# Patient Record
Sex: MALE | Race: ASIAN | NOT HISPANIC OR LATINO | URBAN - METROPOLITAN AREA
[De-identification: names, ages, dates, MRNs, and addresses within clinical notes are randomized per-mention and may not be internally consistent; named-entity substitution may affect disease eponyms.]

---

## 2020-09-13 ENCOUNTER — EMERGENCY (EMERGENCY)
Facility: HOSPITAL | Age: 40
LOS: 1 days | Discharge: HOME | End: 2020-09-13
Attending: EMERGENCY MEDICINE | Admitting: EMERGENCY MEDICINE
Payer: MEDICAID

## 2020-09-13 VITALS
OXYGEN SATURATION: 99 % | RESPIRATION RATE: 16 BRPM | DIASTOLIC BLOOD PRESSURE: 97 MMHG | HEIGHT: 63 IN | TEMPERATURE: 98 F | HEART RATE: 88 BPM | SYSTOLIC BLOOD PRESSURE: 145 MMHG

## 2020-09-13 PROCEDURE — 99283 EMERGENCY DEPT VISIT LOW MDM: CPT

## 2020-09-13 RX ORDER — OXYCODONE AND ACETAMINOPHEN 5; 325 MG/1; MG/1
1 TABLET ORAL ONCE
Refills: 0 | Status: DISCONTINUED | OUTPATIENT
Start: 2020-09-13 | End: 2020-09-13

## 2020-09-13 RX ORDER — OXYCODONE HYDROCHLORIDE 5 MG/1
1 TABLET ORAL
Qty: 7 | Refills: 0
Start: 2020-09-13 | End: 2020-09-19

## 2020-09-13 RX ORDER — DIAZEPAM 5 MG
1 TABLET ORAL
Qty: 12 | Refills: 0
Start: 2020-09-13 | End: 2020-09-16

## 2020-09-13 RX ORDER — DIAZEPAM 5 MG
5 TABLET ORAL ONCE
Refills: 0 | Status: DISCONTINUED | OUTPATIENT
Start: 2020-09-13 | End: 2020-09-13

## 2020-09-13 RX ORDER — LIDOCAINE 4 G/100G
1 CREAM TOPICAL ONCE
Refills: 0 | Status: COMPLETED | OUTPATIENT
Start: 2020-09-13 | End: 2020-09-13

## 2020-09-13 RX ADMIN — LIDOCAINE 1 PATCH: 4 CREAM TOPICAL at 22:50

## 2020-09-13 RX ADMIN — Medication 5 MILLIGRAM(S): at 22:50

## 2020-09-13 RX ADMIN — OXYCODONE AND ACETAMINOPHEN 1 TABLET(S): 5; 325 TABLET ORAL at 22:51

## 2020-09-13 NOTE — ED PROVIDER NOTE - OBJECTIVE STATEMENT
39 y/o male no PMH presents to ER c/o back pain x 3 days. Pt. staets 3 days ago developed lower back which has gotten progressivley worse - describes 10/10 lower back pain which is worse with movement - patient went to [place] ER today - was given naproxen and robaxin with minimal relief. States he was dc with no plan and doesn't know what to do next. Denies incontinence numbness tingling weakness fever chills.

## 2020-09-13 NOTE — ED ADULT NURSE NOTE - CHIEF COMPLAINT QUOTE
pt c/o lower back pain since Friday, seen at [facility] today prescribed Robaxin and Naproxen, states took 1 dose at 6 pm, "but its not helping". denies any trauma or injury to lower back, denies urinary symptoms.

## 2020-09-13 NOTE — ED PROVIDER NOTE - ATTENDING CONTRIBUTION TO CARE
41 yo ow healthy male with 3 days of low back pain.  Progressively getting worse.  Movement exacerbates the pain.  Does not radiate.  Went to OSH where was given NSAIDs and robaxin which provided some relief.  Presenting today for second opinion and more care.  NO bowel or bladder incontinence no weakness and no fevers    Gen:  Well appearing in NAD  Head:  NC/AT  Resp: No distress   Ext: no deformities  Skin: warm and dry as visualized     41 yo with low back pain.  Seems MS in nature.  No red flags for cord compression.  Will treat symptomatically and provide spine follow up

## 2020-09-13 NOTE — ED ADULT TRIAGE NOTE - CHIEF COMPLAINT QUOTE
pt c/o lower back pain since Friday, seen at [facility] today prescribed Robaxin and Naproxen, states took 1 dose at 6 pm, "but its not helping". denies any trauma or injury to lower back, denies urinary symptoms.

## 2020-09-13 NOTE — ED ADULT NURSE NOTE - OBJECTIVE STATEMENT
Pt. presents to intake room 10, A&OX4, ambulatory. Pt. c/o lower back pain for the past 5 days. Pt. states he was seen at [facility] and discharged but since then his back pain has only gotten worse. Respirations even and unlabored. pt. denies any chest pain, SOB, HA, fevers, or chills. Meds given per MD order. Comfort measures in place. Awaiting further orders. Will continue to monitor.

## 2020-09-13 NOTE — ED PROVIDER NOTE - CLINICAL SUMMARY MEDICAL DECISION MAKING FREE TEXT BOX
41 y/o male c/o lower back pain x 3 days  -possible muscle strain vs radiculopathy  -pain contro - nsaids/valium/lidoderm  -outpt ortho/spine follow up

## 2020-09-13 NOTE — ED PROVIDER NOTE - PATIENT PORTAL LINK FT
You can access the FollowMyHealth Patient Portal offered by [facility] by registering at the following website: [email or web address]. By joining [employer]’s FollowMyHealth portal, you will also be able to view your health information using other applications (apps) compatible with our system.

## 2020-09-13 NOTE — ED PROVIDER NOTE - MUSCULOSKELETAL MINIMAL EXAM
back: no midline tenderness, no swelling or obvious deformity. full rom with mild pain. bl lower ext ms 5/5 - able to straigth leg raise without difficulty. able to ambulate with pain. sensations intact.

## 2020-09-13 NOTE — ED PROVIDER NOTE - NSFOLLOWUPINSTRUCTIONS_ED_ALL_ED_FT
REST, NO STRENUOUS ACTIVITY  CONTINUE TAKING ANY CURRENT MEDICATIONS  TAKE ANY NEW MEDICATIONS AS DIRECTED  **FOLLOW UP WITH ORTHOPEDICS/SPINE AS DIRECTED**  REFERRAL SHEET GIVEN  RETURN TO ER FOR WORSENING SYMPTOMS

## 2020-09-16 ENCOUNTER — APPOINTMENT (OUTPATIENT)
Dept: ORTHOPEDIC SURGERY | Facility: CLINIC | Age: 40
End: 2020-09-16
Payer: COMMERCIAL

## 2020-09-16 VITALS
WEIGHT: 160 LBS | HEIGHT: 63 IN | SYSTOLIC BLOOD PRESSURE: 153 MMHG | HEART RATE: 79 BPM | BODY MASS INDEX: 28.35 KG/M2 | DIASTOLIC BLOOD PRESSURE: 85 MMHG

## 2020-09-16 VITALS — TEMPERATURE: 98.1 F

## 2020-09-16 DIAGNOSIS — M47.817 SPONDYLOSIS W/OUT MYELOPATHY OR RADICULOPATHY, LUMBOSACRAL REGION: ICD-10-CM

## 2020-09-16 DIAGNOSIS — S39.012A STRAIN OF MUSCLE, FASCIA AND TENDON OF LOWER BACK, INITIAL ENCOUNTER: ICD-10-CM

## 2020-09-16 DIAGNOSIS — M54.5 LOW BACK PAIN: ICD-10-CM

## 2020-09-16 PROBLEM — Z00.00 ENCOUNTER FOR PREVENTIVE HEALTH EXAMINATION: Status: ACTIVE | Noted: 2020-09-16

## 2020-09-16 PROCEDURE — 99203 OFFICE O/P NEW LOW 30 MIN: CPT

## 2020-09-16 PROCEDURE — 72100 X-RAY EXAM L-S SPINE 2/3 VWS: CPT

## 2020-09-16 RX ORDER — MELOXICAM 15 MG/1
15 TABLET ORAL
Qty: 30 | Refills: 0 | Status: ACTIVE | COMMUNITY
Start: 2020-09-16 | End: 1900-01-01

## 2020-09-16 RX ORDER — CYCLOBENZAPRINE HYDROCHLORIDE 10 MG/1
10 TABLET, FILM COATED ORAL 3 TIMES DAILY
Qty: 60 | Refills: 0 | Status: ACTIVE | COMMUNITY
Start: 2020-09-16 | End: 1900-01-01

## 2020-09-16 NOTE — PHYSICAL EXAM
[Antalgic] : not antalgic [de-identified] : Examination of the lumbar spine reveals no midline tenderness palpation, step-offs, or skin lesions. Decreased range of motion with respect to flexion, extension, lateral bending, and rotation. No tenderness to palpation of the sciatic notch. No tenderness palpation of the bilateral greater trochanters. No pain with passive internal/external rotation of the hips. No instability of bilateral lower extremities.  Negative SONIA. Negative straight leg raise bilaterally. No bowstring. Negative femoral stretch. 5 out of 5 iliopsoas, hip abductors, hips adductors, quadriceps, hamstrings, gastrocsoleus, tibialis anterior, extensor hallucis longus, peroneals. Grossly intact sensation to light touch bilateral lower extremities. 1+ patellar and Achilles reflexes. Downgoing Babinski. No clonus. Intact proprioception. Palpable pulses. No skin lesion and no edema on the right and left lower extremities. [de-identified] : AP lateral lumbar x-rays reveals mild spondylosis without instability or aggressive lesions

## 2020-09-16 NOTE — DISCUSSION/SUMMARY
[de-identified] : He will try course of physical therapy as well as meloxicam and Flexeril.  MRI if not improved.

## 2020-09-16 NOTE — HISTORY OF PRESENT ILLNESS
[de-identified] : Mr. SANTOS MUIR  is a 40 year old male who presents with one week of low back pain without any specific cause or trauma.  His pain was so bad on Sunday that he went to [facility] ER and then to [facility] ER.  He was given Naprosyn which did not help.  Denies any LE radicular symptoms.  Normal bowel and bladder control.   Denies any recent fevers, chills, sweats, weight loss, or infection.\par \par The patients past medical history, past surgical history, medications, allergies, and social history were reviewed by me today with the patient and documented accordingly.  In addition, the patient's family history, which is noncontributory to their visit, was also reviewed.\par

## 2023-09-12 ENCOUNTER — EMERGENCY (EMERGENCY)
Facility: HOSPITAL | Age: 43
LOS: 1 days | Discharge: HOME | End: 2023-09-12
Attending: EMERGENCY MEDICINE | Admitting: EMERGENCY MEDICINE
Payer: COMMERCIAL

## 2023-09-12 VITALS
DIASTOLIC BLOOD PRESSURE: 73 MMHG | SYSTOLIC BLOOD PRESSURE: 118 MMHG | OXYGEN SATURATION: 100 % | TEMPERATURE: 98 F | HEART RATE: 95 BPM | RESPIRATION RATE: 16 BRPM

## 2023-09-12 VITALS
RESPIRATION RATE: 18 BRPM | HEART RATE: 98 BPM | DIASTOLIC BLOOD PRESSURE: 93 MMHG | OXYGEN SATURATION: 99 % | TEMPERATURE: 99 F | SYSTOLIC BLOOD PRESSURE: 133 MMHG

## 2023-09-12 LAB
ALBUMIN SERPL ELPH-MCNC: 4.5 G/DL — SIGNIFICANT CHANGE UP (ref 3.3–5)
ALP SERPL-CCNC: 85 U/L — SIGNIFICANT CHANGE UP (ref 40–120)
ALT FLD-CCNC: 28 U/L — SIGNIFICANT CHANGE UP (ref 4–41)
AMPHET UR-MCNC: NEGATIVE — SIGNIFICANT CHANGE UP
ANION GAP SERPL CALC-SCNC: 12 MMOL/L — SIGNIFICANT CHANGE UP (ref 7–14)
APAP SERPL-MCNC: <10 UG/ML — LOW (ref 15–25)
APPEARANCE UR: CLEAR — SIGNIFICANT CHANGE UP
AST SERPL-CCNC: 20 U/L — SIGNIFICANT CHANGE UP (ref 4–40)
BACTERIA # UR AUTO: NEGATIVE /HPF — SIGNIFICANT CHANGE UP
BARBITURATES UR SCN-MCNC: NEGATIVE — SIGNIFICANT CHANGE UP
BASE EXCESS BLDV CALC-SCNC: 0 MMOL/L — SIGNIFICANT CHANGE UP (ref -2–3)
BASOPHILS # BLD AUTO: 0.01 K/UL — SIGNIFICANT CHANGE UP (ref 0–0.2)
BASOPHILS NFR BLD AUTO: 0.1 % — SIGNIFICANT CHANGE UP (ref 0–2)
BENZODIAZ UR-MCNC: NEGATIVE — SIGNIFICANT CHANGE UP
BILIRUB SERPL-MCNC: 0.2 MG/DL — SIGNIFICANT CHANGE UP (ref 0.2–1.2)
BILIRUB UR-MCNC: NEGATIVE — SIGNIFICANT CHANGE UP
BLOOD GAS VENOUS COMPREHENSIVE RESULT: SIGNIFICANT CHANGE UP
BUN SERPL-MCNC: 12 MG/DL — SIGNIFICANT CHANGE UP (ref 7–23)
CALCIUM SERPL-MCNC: 8.5 MG/DL — SIGNIFICANT CHANGE UP (ref 8.4–10.5)
CAST: 0 /LPF — SIGNIFICANT CHANGE UP (ref 0–4)
CHLORIDE BLDV-SCNC: 102 MMOL/L — SIGNIFICANT CHANGE UP (ref 96–108)
CHLORIDE SERPL-SCNC: 103 MMOL/L — SIGNIFICANT CHANGE UP (ref 98–107)
CO2 BLDV-SCNC: 26.7 MMOL/L — HIGH (ref 22–26)
CO2 SERPL-SCNC: 25 MMOL/L — SIGNIFICANT CHANGE UP (ref 22–31)
COCAINE METAB.OTHER UR-MCNC: NEGATIVE — SIGNIFICANT CHANGE UP
COLOR SPEC: YELLOW — SIGNIFICANT CHANGE UP
CREAT SERPL-MCNC: 0.59 MG/DL — SIGNIFICANT CHANGE UP (ref 0.5–1.3)
CREATININE URINE RESULT, DAU: 48 MG/DL — SIGNIFICANT CHANGE UP
DIFF PNL FLD: NEGATIVE — SIGNIFICANT CHANGE UP
EGFR: 125 ML/MIN/1.73M2 — SIGNIFICANT CHANGE UP
EOSINOPHIL # BLD AUTO: 0.27 K/UL — SIGNIFICANT CHANGE UP (ref 0–0.5)
EOSINOPHIL NFR BLD AUTO: 3.8 % — SIGNIFICANT CHANGE UP (ref 0–6)
ETHANOL SERPL-MCNC: <10 MG/DL — SIGNIFICANT CHANGE UP
GAS PNL BLDV: 138 MMOL/L — SIGNIFICANT CHANGE UP (ref 136–145)
GLUCOSE BLDV-MCNC: 92 MG/DL — SIGNIFICANT CHANGE UP (ref 70–99)
GLUCOSE SERPL-MCNC: 99 MG/DL — SIGNIFICANT CHANGE UP (ref 70–99)
GLUCOSE UR QL: NEGATIVE MG/DL — SIGNIFICANT CHANGE UP
HCO3 BLDV-SCNC: 25 MMOL/L — SIGNIFICANT CHANGE UP (ref 22–29)
HCT VFR BLD CALC: 41.7 % — SIGNIFICANT CHANGE UP (ref 39–50)
HCT VFR BLDA CALC: 45 % — SIGNIFICANT CHANGE UP (ref 39–51)
HGB BLD CALC-MCNC: 15 G/DL — SIGNIFICANT CHANGE UP (ref 12.6–17.4)
HGB BLD-MCNC: 14.8 G/DL — SIGNIFICANT CHANGE UP (ref 13–17)
IANC: 3.15 K/UL — SIGNIFICANT CHANGE UP (ref 1.8–7.4)
IMM GRANULOCYTES NFR BLD AUTO: 0.4 % — SIGNIFICANT CHANGE UP (ref 0–0.9)
KETONES UR-MCNC: NEGATIVE MG/DL — SIGNIFICANT CHANGE UP
LACTATE BLDV-MCNC: 1.3 MMOL/L — SIGNIFICANT CHANGE UP (ref 0.5–2)
LEUKOCYTE ESTERASE UR-ACNC: NEGATIVE — SIGNIFICANT CHANGE UP
LYMPHOCYTES # BLD AUTO: 3.19 K/UL — SIGNIFICANT CHANGE UP (ref 1–3.3)
LYMPHOCYTES # BLD AUTO: 44.7 % — HIGH (ref 13–44)
MAGNESIUM SERPL-MCNC: 2.2 MG/DL — SIGNIFICANT CHANGE UP (ref 1.6–2.6)
MCHC RBC-ENTMCNC: 28.8 PG — SIGNIFICANT CHANGE UP (ref 27–34)
MCHC RBC-ENTMCNC: 35.5 GM/DL — SIGNIFICANT CHANGE UP (ref 32–36)
MCV RBC AUTO: 81.3 FL — SIGNIFICANT CHANGE UP (ref 80–100)
METHADONE UR-MCNC: NEGATIVE — SIGNIFICANT CHANGE UP
MONOCYTES # BLD AUTO: 0.48 K/UL — SIGNIFICANT CHANGE UP (ref 0–0.9)
MONOCYTES NFR BLD AUTO: 6.7 % — SIGNIFICANT CHANGE UP (ref 2–14)
NEUTROPHILS # BLD AUTO: 3.15 K/UL — SIGNIFICANT CHANGE UP (ref 1.8–7.4)
NEUTROPHILS NFR BLD AUTO: 44.3 % — SIGNIFICANT CHANGE UP (ref 43–77)
NITRITE UR-MCNC: NEGATIVE — SIGNIFICANT CHANGE UP
NRBC # BLD: 0 /100 WBCS — SIGNIFICANT CHANGE UP (ref 0–0)
NRBC # FLD: 0 K/UL — SIGNIFICANT CHANGE UP (ref 0–0)
OPIATES UR-MCNC: NEGATIVE — SIGNIFICANT CHANGE UP
OXYCODONE UR-MCNC: NEGATIVE — SIGNIFICANT CHANGE UP
PCO2 BLDV: 43 MMHG — SIGNIFICANT CHANGE UP (ref 42–55)
PCP SPEC-MCNC: SIGNIFICANT CHANGE UP
PCP UR-MCNC: NEGATIVE — SIGNIFICANT CHANGE UP
PH BLDV: 7.38 — SIGNIFICANT CHANGE UP (ref 7.32–7.43)
PH UR: 6 — SIGNIFICANT CHANGE UP (ref 5–8)
PHOSPHATE SERPL-MCNC: 3.5 MG/DL — SIGNIFICANT CHANGE UP (ref 2.5–4.5)
PLATELET # BLD AUTO: 211 K/UL — SIGNIFICANT CHANGE UP (ref 150–400)
PO2 BLDV: 92 MMHG — HIGH (ref 25–45)
POTASSIUM BLDV-SCNC: 3.6 MMOL/L — SIGNIFICANT CHANGE UP (ref 3.5–5.1)
POTASSIUM SERPL-MCNC: 3.7 MMOL/L — SIGNIFICANT CHANGE UP (ref 3.5–5.3)
POTASSIUM SERPL-SCNC: 3.7 MMOL/L — SIGNIFICANT CHANGE UP (ref 3.5–5.3)
PROT SERPL-MCNC: 8.2 G/DL — SIGNIFICANT CHANGE UP (ref 6–8.3)
PROT UR-MCNC: NEGATIVE MG/DL — SIGNIFICANT CHANGE UP
RBC # BLD: 5.13 M/UL — SIGNIFICANT CHANGE UP (ref 4.2–5.8)
RBC # FLD: 12.9 % — SIGNIFICANT CHANGE UP (ref 10.3–14.5)
RBC CASTS # UR COMP ASSIST: 0 /HPF — SIGNIFICANT CHANGE UP (ref 0–4)
SALICYLATES SERPL-MCNC: <0.3 MG/DL — LOW (ref 15–30)
SAO2 % BLDV: 97.7 % — HIGH (ref 67–88)
SODIUM SERPL-SCNC: 140 MMOL/L — SIGNIFICANT CHANGE UP (ref 135–145)
SP GR SPEC: 1.01 — SIGNIFICANT CHANGE UP (ref 1–1.03)
SQUAMOUS # UR AUTO: 0 /HPF — SIGNIFICANT CHANGE UP (ref 0–5)
THC UR QL: NEGATIVE — SIGNIFICANT CHANGE UP
TROPONIN T, HIGH SENSITIVITY RESULT: <6 NG/L — SIGNIFICANT CHANGE UP
UROBILINOGEN FLD QL: 0.2 MG/DL — SIGNIFICANT CHANGE UP (ref 0.2–1)
WBC # BLD: 7.13 K/UL — SIGNIFICANT CHANGE UP (ref 3.8–10.5)
WBC # FLD AUTO: 7.13 K/UL — SIGNIFICANT CHANGE UP (ref 3.8–10.5)
WBC UR QL: 0 /HPF — SIGNIFICANT CHANGE UP (ref 0–5)

## 2023-09-12 PROCEDURE — 71046 X-RAY EXAM CHEST 2 VIEWS: CPT | Mod: 26

## 2023-09-12 PROCEDURE — G1004: CPT

## 2023-09-12 PROCEDURE — 99285 EMERGENCY DEPT VISIT HI MDM: CPT

## 2023-09-12 PROCEDURE — 70450 CT HEAD/BRAIN W/O DYE: CPT | Mod: 26,MD

## 2023-09-12 PROCEDURE — 93010 ELECTROCARDIOGRAM REPORT: CPT

## 2023-09-12 PROCEDURE — 72125 CT NECK SPINE W/O DYE: CPT | Mod: 26,MG

## 2023-09-12 RX ORDER — ACETAMINOPHEN 500 MG
650 TABLET ORAL ONCE
Refills: 0 | Status: COMPLETED | OUTPATIENT
Start: 2023-09-12 | End: 2023-09-12

## 2023-09-12 RX ORDER — CARBAMAZEPINE 200 MG
3 TABLET ORAL
Qty: 84 | Refills: 0
Start: 2023-09-12 | End: 2023-09-25

## 2023-09-12 RX ORDER — LEVETIRACETAM 250 MG/1
1000 TABLET, FILM COATED ORAL ONCE
Refills: 0 | Status: COMPLETED | OUTPATIENT
Start: 2023-09-12 | End: 2023-09-12

## 2023-09-12 RX ORDER — CARBAMAZEPINE 200 MG
900 TABLET ORAL ONCE
Refills: 0 | Status: COMPLETED | OUTPATIENT
Start: 2023-09-12 | End: 2023-09-12

## 2023-09-12 RX ADMIN — LEVETIRACETAM 400 MILLIGRAM(S): 250 TABLET, FILM COATED ORAL at 17:25

## 2023-09-12 RX ADMIN — Medication 900 MILLIGRAM(S): at 22:58

## 2023-09-12 RX ADMIN — Medication 650 MILLIGRAM(S): at 20:34

## 2023-09-12 NOTE — ED PROVIDER NOTE - PHYSICAL EXAMINATION
Gen: AAOx2 (self, hospital), non-toxic young male sitting in bed in NAD  Head: NCAT  HEENT: EOMI, oral mucosa moist, normal conjunctiva  Lung: CTAB, no respiratory distress, no wheezes/rhonchi/rales B/L, speaking in full sentences  CV: RRR, no murmurs, rubs or gallops  Abd: soft, NTND, no guarding, no CVA tenderness  MSK: no visible deformities  Neuro: No focal sensory or motor deficits elicited, MAEx4  Skin: Warm, well perfused, no rash  Psych: confused/post-ictal, pleasant   ~Pancho Osman M.D. Resident
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
## 2023-09-12 NOTE — ED ADULT NURSE REASSESSMENT NOTE - NS ED NURSE REASSESS COMMENT FT1
Received report from day RN. Pt received in 3A, pt at baseline Aox4 with family at bedside.  Pt denies any current pain, weakness. offers no complaints. Pending dispo and neuro. instructed to call for assistance as needed.

## 2023-09-12 NOTE — CONSULT NOTE ADULT - NSCONSULTADDITIONALINFOA_GEN_ALL_CORE
Case discussed with resident and I agree with assessment and plan as above.  Pt was discharged prior to my being able to assess in the emergency room.    Joshua Valiente

## 2023-09-12 NOTE — ED PROVIDER NOTE - PATIENT PORTAL LINK FT
You can access the FollowMyHealth Patient Portal offered by [facility] by registering at the following website: [email or web address]. By joining [employer]’s FollowMyHealth portal, you will also be able to view your health information using other applications (apps) compatible with our system.

## 2023-09-12 NOTE — ED ADULT NURSE NOTE - NSFALLRISKINTERV_ED_ALL_ED
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
## 2023-09-12 NOTE — CONSULT NOTE ADULT - ASSESSMENT
43y M with Hx epilepsy, HTN, here for seizure    Impression: 1x LOC with post-ictal agitation/confusion, consistent w/ known seizure presentation. Tegretol is self-inducer and likely has depleted levels over time. Would increase Tegretol and have patient follow-up with outpatient neurologist. Can also follow-up on AED levels as outpatient    Recommendations:  [] no neurological contraindication to discharge if otherwise medically stable  [] increase Tegretol to 900mg BID  [] c/w home Keppra 1g BID  [] patient educated not to drive for at least 1 year. Also informed about risk of SUDEP (per [facility] law).  [] fall/seizure precautions  [] neuro checks and vitals q8h  [] will sign off    Case discussed w/ attending Dr. Valiente 43y M with Hx epilepsy, HTN, here for seizure    Impression: 1x LOC with post-ictal agitation/confusion, consistent w/ known seizure presentation. Tegretol is self-inducer and likely has depleted levels over time. Would increase Tegretol and have patient follow-up with outpatient neurologist. Can also follow-up on AED levels as outpatient    Recommendations:  [] no neurological contraindication to discharge if otherwise medically stable  [] increase Tegretol to 900mg BID  [] c/w home Keppra 1g BID  [] patient educated not to drive for at least 1 year. Also informed about risk of SUDEP (per [facility] law).  [] follow-up with neurologist within 1 week. Please instruct to bring results AED levels to neurologist  [] fall/seizure precautions  [] neuro checks and vitals q8h  [] will sign off    Case discussed w/ attending Dr. Valiente
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
## 2023-09-15 LAB — LEVETIRACETAM SERPL-MCNC: <2 UG/ML — LOW (ref 10–40)

## 2023-09-18 NOTE — ED POST DISCHARGE NOTE - REASON FOR FOLLOW-UP
Other Kepra level <2.0 . Called patient and spoke with patient's brother Vicky who assured me he will speak with patient about low kepra level and have patient follow up with Neurologist. Strict return precautions given to patient's brother.
